# Patient Record
Sex: FEMALE | Race: WHITE
[De-identification: names, ages, dates, MRNs, and addresses within clinical notes are randomized per-mention and may not be internally consistent; named-entity substitution may affect disease eponyms.]

---

## 2017-03-09 ENCOUNTER — HOSPITAL ENCOUNTER (EMERGENCY)
Dept: HOSPITAL 47 - EC | Age: 77
Discharge: HOME | End: 2017-03-09
Payer: MEDICARE

## 2017-03-09 VITALS
DIASTOLIC BLOOD PRESSURE: 78 MMHG | HEART RATE: 98 BPM | RESPIRATION RATE: 18 BRPM | TEMPERATURE: 98.5 F | SYSTOLIC BLOOD PRESSURE: 135 MMHG

## 2017-03-09 DIAGNOSIS — K13.70: Primary | ICD-10-CM

## 2017-03-09 DIAGNOSIS — R05: ICD-10-CM

## 2017-03-09 DIAGNOSIS — Z79.899: ICD-10-CM

## 2017-03-09 LAB
ALP SERPL-CCNC: 162 U/L (ref 38–126)
ALT SERPL-CCNC: 31 U/L (ref 9–52)
ANION GAP SERPL CALC-SCNC: 10 MMOL/L
AST SERPL-CCNC: 28 U/L (ref 14–36)
BASOPHILS # BLD AUTO: 0 K/UL (ref 0–0.2)
BASOPHILS NFR BLD AUTO: 0 %
BUN SERPL-SCNC: 12 MG/DL (ref 7–17)
CALCIUM SPEC-MCNC: 9.7 MG/DL (ref 8.4–10.2)
CH: 29.6
CHCM: 33.5
CHLORIDE SERPL-SCNC: 101 MMOL/L (ref 98–107)
CO2 SERPL-SCNC: 29 MMOL/L (ref 22–30)
EOSINOPHIL # BLD AUTO: 0 K/UL (ref 0–0.7)
EOSINOPHIL NFR BLD AUTO: 0 %
ERYTHROCYTE [DISTWIDTH] IN BLOOD BY AUTOMATED COUNT: 4.28 M/UL (ref 3.8–5.4)
ERYTHROCYTE [DISTWIDTH] IN BLOOD: 14.1 % (ref 11.5–15.5)
GLUCOSE SERPL-MCNC: 96 MG/DL (ref 74–99)
HCT VFR BLD AUTO: 38 % (ref 34–46)
HDW: 2.79
HGB BLD-MCNC: 12.6 GM/DL (ref 11.4–16)
LUC NFR BLD AUTO: 3 %
LYMPHOCYTES # SPEC AUTO: 1.3 K/UL (ref 1–4.8)
LYMPHOCYTES NFR SPEC AUTO: 26 %
MCH RBC QN AUTO: 29.4 PG (ref 25–35)
MCHC RBC AUTO-ENTMCNC: 33.2 G/DL (ref 31–37)
MCV RBC AUTO: 88.8 FL (ref 80–100)
MONOCYTES # BLD AUTO: 0.3 K/UL (ref 0–1)
MONOCYTES NFR BLD AUTO: 5 %
NEUTROPHILS # BLD AUTO: 3.4 K/UL (ref 1.3–7.7)
NEUTROPHILS NFR BLD AUTO: 66 %
NON-AFRICAN AMERICAN GFR(MDRD): >60
POTASSIUM SERPL-SCNC: 4.6 MMOL/L (ref 3.5–5.1)
PROT SERPL-MCNC: 6.5 G/DL (ref 6.3–8.2)
SODIUM SERPL-SCNC: 140 MMOL/L (ref 137–145)
WBC # BLD AUTO: 0.16 10*3/UL
WBC # BLD AUTO: 5.1 K/UL (ref 3.8–10.6)
WBC (PEROX): 5.31

## 2017-03-09 PROCEDURE — 87529 HSV DNA AMP PROBE: CPT

## 2017-03-09 PROCEDURE — 85025 COMPLETE CBC W/AUTO DIFF WBC: CPT

## 2017-03-09 PROCEDURE — 36415 COLL VENOUS BLD VENIPUNCTURE: CPT

## 2017-03-09 PROCEDURE — 87252 VIRUS INOCULATION TISSUE: CPT

## 2017-03-09 PROCEDURE — 99283 EMERGENCY DEPT VISIT LOW MDM: CPT

## 2017-03-09 PROCEDURE — 87498 ENTEROVIRUS PROBE&REVRS TRNS: CPT

## 2017-03-09 PROCEDURE — 87430 STREP A AG IA: CPT

## 2017-03-09 PROCEDURE — 87798 DETECT AGENT NOS DNA AMP: CPT

## 2017-03-09 PROCEDURE — 87081 CULTURE SCREEN ONLY: CPT

## 2017-03-09 PROCEDURE — 80053 COMPREHEN METABOLIC PANEL: CPT

## 2017-03-09 NOTE — ED
ENT HPI





- General


Chief complaint: Dental/Oral


Stated complaint: BLISTERS ON TOUNGE


Time Seen by Provider: 03/09/17 12:37


Source: patient


Mode of arrival: ambulatory


Limitations: no limitations





- History of Present Illness


Initial comments: 





Complaining about some sores in her mouth number she had a sores on the back of 

the tongue she has a history of angioedema she is concerned about her tongue 

swelling this point she denies any tightening of the throat or shortness of 

breath or swelling of the tongue.  He also went to the dentist recently and 

concerned about herpetic lesions in her mouth.  Denies any headaches no neck 

stiffness no chest pain or shortness of breath no abdominal pain no frequency 

urgency dysuria





- Related Data


 Home Medications











 Medication  Instructions  Recorded  Confirmed


 


Calcium Carbonate/Vitamin D3 1 tab PO DAILY 03/09/17 03/09/17





[Calcium 500-Vit D3 600 Tablet]   


 


Folic Acid 1 mg PO DAILY 03/09/17 03/09/17


 


Loratadine [Claritin] 10 mg PO DAILY 03/09/17 03/09/17


 


Vit C/E/Zn/Coppr/Lutein/Zeaxan 1 cap PO DAILY 03/09/17 03/09/17





[Preservision Areds 2 Softgel]   


 


diphenhydrAMINE HCL [Benadryl] 25 mg PO Q8H PRN 03/09/17 03/09/17











 Allergies











Allergy/AdvReac Type Severity Reaction Status Date / Time


 


Penicillins Allergy  Unknown Verified 03/09/17 12:13














Review of Systems


ROS Statement: 


Those systems with pertinent positive or pertinent negative responses have been 

documented in the HPI.





ROS Other: All systems not noted in ROS Statement are negative.





Past Medical History


Additional Past Medical History / Comment(s): angioedema


History of Any Multi-Drug Resistant Organisms: None Reported


Past Surgical History: No Surgical Hx Reported


Past Psychological History: No Psychological Hx Reported


Smoking Status: Former smoker


Past Alcohol Use History: None Reported


Past Drug Use History: None Reported





General Exam





- General Exam Comments


Initial Comments: 


General:  The patient is awake and alert, in no distress, and does not appear 

acutely ill. 


Skin:  Skin is warm and dry and no rashes or lesions are noted. 


Eye:  Pupils are equal, round and reactive to light, extra-ocular movements are 

intact; there is normal conjunctiva bilaterally.  


Ears, nose, mouth and throat:  Small blisters on the tongue and some elevated 

lesions on the back of the tongue. 


Neck:  The neck is supple, there is no tenderness  or JVD.  


Cardiovascular:  There is a regular rate and rhythm. No murmur, rub or gallop 

is appreciated.


Respiratory: To auscultation bilateral, no wheezing no rhonchi no distress  

respiratory wise noticed


Gastrointestinal:  Soft, non-distended, non-tender abdomen without masses or 

organomegaly noted. There is no rebound or guarding present. Bowel sounds are 

unremarkable. 


Back:  There is no tenderness to palpation in the midline. There is no obvious 

deformity.


Musculoskeletal:  Normal ROM, no tenderness, There is no pedal edema. There is 

no calf tenderness or swelling. No cords were appreciated.  


Neurological:  CN II-XII intact, Cranial nerves III through XII are intact. 

There are no obvious motor or sensory deficits. Coordination appears grossly 

intact. Speech is normal.


Psychiatric:  Cooperative, appropriate mood & affect, normal judgment.  





Limitations: no limitations





Course





 Vital Signs











  03/09/17





  11:59


 


Temperature 98.8 F


 


Pulse Rate 111 H


 


Respiratory 20





Rate 


 


Blood Pressure 154/67


 


O2 Sat by Pulse 99





Oximetry 














Medical Decision Making





- Lab Data


Result diagrams: 


 03/09/17 13:15





 03/09/17 13:15





 Lab Results











  03/09/17 03/09/17 03/09/17 Range/Units





  12:57 13:15 13:15 


 


WBC   5.1   (3.8-10.6)  k/uL


 


RBC   4.28   (3.80-5.40)  m/uL


 


Hgb   12.6   (11.4-16.0)  gm/dL


 


Hct   38.0   (34.0-46.0)  %


 


MCV   88.8   (80.0-100.0)  fL


 


MCH   29.4   (25.0-35.0)  pg


 


MCHC   33.2   (31.0-37.0)  g/dL


 


RDW   14.1   (11.5-15.5)  %


 


Plt Count   51 L   (150-450)  k/uL


 


Neutrophils %   66   %


 


Lymphocytes %   26   %


 


Monocytes %   5   %


 


Eosinophils %   0   %


 


Basophils %   0   %


 


Neutrophils #   3.4   (1.3-7.7)  k/uL


 


Lymphocytes #   1.3   (1.0-4.8)  k/uL


 


Monocytes #   0.3   (0-1.0)  k/uL


 


Eosinophils #   0.0   (0-0.7)  k/uL


 


Basophils #   0.0   (0-0.2)  k/uL


 


Anisocytosis (manual)   Present   


 


Sodium    140  (137-145)  mmol/L


 


Potassium    4.6  (3.5-5.1)  mmol/L


 


Chloride    101  ()  mmol/L


 


Carbon Dioxide    29  (22-30)  mmol/L


 


Anion Gap    10  mmol/L


 


BUN    12  (7-17)  mg/dL


 


Creatinine    0.78  (0.52-1.04)  mg/dL


 


Est GFR (MDRD) Af Amer    >60  (>60 ml/min/1.73 sqM)  


 


Est GFR (MDRD) Non-Af    >60  (>60 ml/min/1.73 sqM)  


 


Glucose    96  (74-99)  mg/dL


 


Calcium    9.7  (8.4-10.2)  mg/dL


 


Total Bilirubin    1.0  (0.2-1.3)  mg/dL


 


AST    28  (14-36)  U/L


 


ALT    31  (9-52)  U/L


 


Alkaline Phosphatase    162 H  ()  U/L


 


Total Protein    6.5  (6.3-8.2)  g/dL


 


Albumin    4.0  (3.5-5.0)  g/dL


 


Group A Strep Rapid  Negative    (Negative)  














Disposition


Clinical Impression: 


 Mouth sores





Disposition: HOME SELF-CARE


Instructions:  Canker Sores (ED)

## 2017-03-10 LAB — Lab: (no result)

## 2018-02-28 ENCOUNTER — HOSPITAL ENCOUNTER (INPATIENT)
Dept: HOSPITAL 47 - EC | Age: 78
LOS: 1 days | Discharge: HOME | DRG: 916 | End: 2018-03-01
Payer: MEDICARE

## 2018-02-28 VITALS — BODY MASS INDEX: 22.4 KG/M2

## 2018-02-28 DIAGNOSIS — Z87.891: ICD-10-CM

## 2018-02-28 DIAGNOSIS — Z83.3: ICD-10-CM

## 2018-02-28 DIAGNOSIS — D69.3: ICD-10-CM

## 2018-02-28 DIAGNOSIS — Z85.828: ICD-10-CM

## 2018-02-28 DIAGNOSIS — T78.3XXA: Primary | ICD-10-CM

## 2018-02-28 DIAGNOSIS — Z88.0: ICD-10-CM

## 2018-02-28 DIAGNOSIS — Z82.49: ICD-10-CM

## 2018-02-28 DIAGNOSIS — K21.9: ICD-10-CM

## 2018-02-28 LAB — GLUCOSE BLD-MCNC: 141 MG/DL (ref 75–99)

## 2018-02-28 PROCEDURE — 70450 CT HEAD/BRAIN W/O DYE: CPT

## 2018-02-28 PROCEDURE — 96374 THER/PROPH/DIAG INJ IV PUSH: CPT

## 2018-02-28 PROCEDURE — 70360 X-RAY EXAM OF NECK: CPT

## 2018-02-28 PROCEDURE — 71046 X-RAY EXAM CHEST 2 VIEWS: CPT

## 2018-02-28 PROCEDURE — 99285 EMERGENCY DEPT VISIT HI MDM: CPT

## 2018-02-28 PROCEDURE — 96375 TX/PRO/DX INJ NEW DRUG ADDON: CPT

## 2018-02-28 PROCEDURE — 96376 TX/PRO/DX INJ SAME DRUG ADON: CPT

## 2018-02-28 RX ADMIN — DEXAMETHASONE SODIUM PHOSPHATE SCH MG: 4 INJECTION, SOLUTION INTRAMUSCULAR; INTRAVENOUS at 23:53

## 2018-02-28 RX ADMIN — FAMOTIDINE SCH MG: 10 INJECTION, SOLUTION INTRAVENOUS at 20:50

## 2018-02-28 RX ADMIN — DIPHENHYDRAMINE HYDROCHLORIDE SCH MG: 50 INJECTION, SOLUTION INTRAMUSCULAR; INTRAVENOUS at 23:53

## 2018-02-28 RX ADMIN — DIPHENHYDRAMINE HYDROCHLORIDE SCH MG: 50 INJECTION, SOLUTION INTRAMUSCULAR; INTRAVENOUS at 15:38

## 2018-02-28 RX ADMIN — DIPHENHYDRAMINE HYDROCHLORIDE SCH MG: 50 INJECTION, SOLUTION INTRAMUSCULAR; INTRAVENOUS at 18:48

## 2018-02-28 RX ADMIN — CEFAZOLIN SCH MLS/HR: 330 INJECTION, POWDER, FOR SOLUTION INTRAMUSCULAR; INTRAVENOUS at 13:23

## 2018-02-28 RX ADMIN — CEFAZOLIN SCH MLS/HR: 330 INJECTION, POWDER, FOR SOLUTION INTRAMUSCULAR; INTRAVENOUS at 23:53

## 2018-02-28 RX ADMIN — DEXAMETHASONE SODIUM PHOSPHATE SCH MG: 4 INJECTION, SOLUTION INTRAMUSCULAR; INTRAVENOUS at 18:49

## 2018-02-28 NOTE — XR
EXAMINATION TYPE: XR chest 2V

 

DATE OF EXAM: 2/28/2018

 

COMPARISON: 6/1/2012

 

HISTORY: Throat and tongue swelling. History of angioedema.

 

TECHNIQUE:  Frontal and lateral views of the chest are obtained.

 

FINDINGS:  There is no focal air space opacity, pleural effusion, or pneumothorax seen.  The cardiac 
silhouette size is within normal limits.   The osseous structures are intact. Minimal multilevel dege
nerative changes of the thoracic spine with a slightly exaggerated thoracic kyphosis is noted. Infrag
lottic tracheal narrowing can relate to phase of expiration.

 

IMPRESSION: Infraglottic tracheal narrowing is noted however this can relate to the degree of expirat
ion. There is concern for injury edema direct visualization is recommended. No acute cardiopulmonary 
process.

## 2018-02-28 NOTE — XR
EXAMINATION TYPE: XR soft tissue neck

 

DATE OF EXAM: 2/28/2018

 

COMPARISON: Chest x-ray of the same date

 

HISTORY: Angioedema. Throat swelling.

 

TECHNIQUE: Frontal and lateral soft tissues of the neck were obtained.

 

FINDINGS: Multilevel moderate degenerative changes of the cervical spine are seen as intervertebral d
isc space nearing, uncovertebral or protrusion, facet arthropathy, small interosseous heights and end
plate sclerosis. There is no prevertebral soft tissue swelling.

 

The epiglottis is prominent in size. There does appear to be soft tissue swelling of the neck anterio
r to the thyroid cartilage. The frontal image is suboptimal but redemonstrates mild infraglottic narr
owing that could relate to degree of expiration. Supraglottic airway is patent.

 

IMPRESSION: 

1. Frontal image is suboptimal but demonstrates mild infraglottic narrowing is seen on the chest radi
ograph. This can be seen in degree of expiration. There is further concern direct visualization is re
commended.

2. Nonspecific soft tissue swelling in an anterior to the thyroid cartilage.

3. Prominence of the epiglottis that could relate to obliquity as epiglottitis is not favored given t
his patient's age.

## 2018-02-28 NOTE — P.HPIM
History of Present Illness


H&P Date: 02/28/18


Chief Complaint: Angioedema and swelling





This is a pleasant 77-year-old lady patient of Dr. Vallejo.  She has underlying 

of angioedema for the past 7 years, hypertension, GERD, hyperlipidemia, history 

of splenomegaly and previous herpes zoster admitted through the emergency room 

secondary to acute onset of angioedema.  Patient was well and she'll T hours 

prior to admission when patient woke up at 4:00 in the morning with tongue 

swelling and thickening, this would be the sixth episode in 6 years, per 

patient is always occurs in the morning upon awakening at 4:00 this morning, on 

each episode.  Last night she has IVs fish sandwich and curly fries, and a 

snack consisting of jelly roll, and seen a month ago, which she normally 

consumes almost on a daily basis.  Patient goes to [facility] with the same state 

fish sandwich 2 times a month with no previous reactions.  Last night in the 

evening she went to [facility] and had no other ingestion except for contact to a 

convincing of that was a disinfectant for carts.  She normally takes Claritin 

at bedtime, however this did not help this time.  Patient has multiple testing 

done including an allergist tests from Dr. Rondon, and she was referred to 

[facility] who did more blood work testing, also ruled out with any 

connective tissue disease according to all these panels, she is okay with 

seafood H shellfish and nuts and cannot put an etiology for her angioedema.  

There is no recent medications from any other physicians, over-the-counter 

medications include calcium and vitamin D 3 and Claritin preservation which hasn

't changed for years.  They have thought that the culprit could be related to 

feel her is from multivitamins however night this change recently.  She denies 

any acute exposure to chemicals at home include being hairspray cleansers or 

disinfectants insecticides at home





She currently is in ICU being closely monitored, Dr. Brewster from critical care 

medicine, in the emergency room x-ray was done that shows nonspecific soft 

tissue swelling in the anterior to the Cardiologic cartilage, prominent 

epiglottis supraglottic airway is patent mild in subglottic narrowing, in the 

emergency room she was given methylprednisone 1.5 mg IV, and currently is on 

dexamethasone 4 mg every 6 hours along with Benadryl 50 mg IV push every 6 hours





Review of Systems


Constitutional: Reports as per HPI, Denies anorexia, Denies chills, Denies 

chronic headaches, Denies chronic pain, Denies daytime sleepiness, Denies 

fatigue, Denies fever, Denies lethargy, Denies malaise, Denies night sweats, 

Denies poor appetite, Denies sweats, Denies weakness, Denies weight gain, 

Denies weight loss


Ears, nose, mouth and throat: Reports as per HPI, Denies ant. neck pain, Denies 

bleeding gums, Denies dental pain, Denies dysphagia, Denies epistaxis, Denies 

headache, Denies hoarseness, Denies mouth pain, Denies nasal congestion, Denies 

nasal discharge, Denies neck fullness/pressure, Denies neck lump, Denies nose 

pain, Denies odynophagia, Denies post-nasal drip, Denies sinus pain, Denies 

sinus pressure, Denies swelling in mouth, Denies swelling in throat, Denies 

sore throat, Denies vertigo, Denies voice changes


Cardiovascular: Reports as per HPI, Denies chest pain, Denies claudication, 

Denies decreased exercise tolerance, Denies dyspnea on exertion, Denies edema, 

Denies high blood pressure, Denies irregular heart beat, Denies leg edema, 

Denies lightheadedness, Denies orthopnea, Denies palpitations, Denies 

paroxysmal nocturnal dyspnea, Denies phlebitis, Denies rapid heart beat, Denies 

shortness of breath (Difficulty in breathing), Denies syncope


Respiratory: Reports as per HPI, Denies congestion, Denies cough, Denies cough 

with sputum, Denies dyspnea, Denies excessive sputum, Denies hemoptysis, Denies 

home oxygen, Denies pain, Denies pain on inspiration, Denies pleurisy, Denies 

respiratory infections, Denies sleep apnea, Denies snoring, Denies wheezing


Gastrointestinal: Reports as per HPI, Denies abdominal pain, Denies belching, 

Denies bloating, Denies BRBPR, Denies change in bowel habits, Denies coffee 

ground emesis, Denies constipation, Denies diarrhea, Denies dyspepsia, Denies 

early satiety, Denies excessive gas, Denies heartburn, Denies hematemesis, 

Denies hematochezia, Denies indigestion, Denies jaundice, Denies lactose 

intolerance, Denies loss of appetite, Denies melena, Denies nausea, Denies 

vomiting


Genitourinary: Reports as per HPI


Menstruation: Reports as per HPI, Denies amenorrhea, Denies amenorrhea on BC, 

Denies currently menstrual, Denies cycle < 21 days, Denies cycle > 35 days, 

Denies cycle variable, Denies menses 1-7 days, Denies menses 8 or > days, 

Denies menses variable, Denies period heavy, Denies period light, Denies period 

normal, Denies period spotting, Denies post hysterectomy, Denies postmenopausal

, Denies premenarcheal


Musculoskeletal: Reports as per HPI, Denies arm numbness/tingling, Denies 

atrophy, Denies fractures, Denies frequent falls, Denies gait dysfunction, 

Denies hot joints, Denies leg numbness/tingling, Denies limitation of motion, 

Denies loss of height, Denies low back pain, Denies morning stiffness, Denies 

muscle cramps, Denies muscle weakness, Denies myalgias, Denies neck pain, 

Denies neck stiffness, Denies prior amputations, Denies redness of joints, 

Denies shooting arm pain, Denies shooting leg pain


Integumentary: Reports as per HPI, Denies acne, Denies boils, Denies brittle 

nails, Denies change in hair/nails, Denies color changes, Denies darkening of 

skin, Denies depigmentation, Denies dryness, Denies foot/leg ulcers, Denies 

growths, Denies hirsutism, Denies lesions, Denies onychomycosis, Denies pruritus

, Denies rash, Denies sores, Denies striae, Denies unusual bruising, Denies 

wounds


Neurological: Reports as per HPI, Denies aphasia, Denies ataxia, Denies balance 

difficulties, Denies burning pain, Denies change in mentation, Denies change in 

smell/taste, Denies change in speech, Denies confusion, Denies convulsions, 

Denies double vision, Denies gait dysfunction, Denies head injury, Denies 

headaches, Denies hearing difficulties, Denies lack of coordination, Denies 

loss of vision, Denies memory loss, Denies migraines, Denies motor disturbance, 

Denies numbness, Denies paralysis, Denies paresthesias, Denies seizures, Denies 

sensory deficit, Denies spasticity, Denies syncope, Denies tic, Denies tingling

, Denies transient paralysis, Denies tremors, Denies vertigo, Denies weakness, 

Denies visual changes


Psychiatric: Reports as per HPI


Endocrine: Reports as per HPI


Hematologic/Lymphatic: Reports as per HPI


Allergic/Immunologic: Reports as per HPI





Past Medical History


Past Medical History: Hyperlipidemia, Hypertension


Additional Past Medical History / Comment(s): angioedema, basal cell carcinoma 

removal


History of Any Multi-Drug Resistant Organisms: None Reported


Past Surgical History: No Surgical Hx Reported


Additional Past Surgical History / Comment(s): Cataracts surgery


Past Anesthesia/Blood Transfusion Reactions: No Reported Reaction


Past Psychological History: No Psychological Hx Reported


Smoking Status: Former smoker


Past Alcohol Use History: None Reported


Past Drug Use History: None Reported





- Past Family History


  ** Mother


Family Medical History: Hypertension, Myocardial Infarction (MI)





  ** Father


Family Medical History: Diabetes Mellitus, Hypertension





  ** Brother(s)


Family Medical History: No Reported History





  ** Daughter(s)


History Unknown: Yes (Healthy 2 daughters no sons no  sisters)





Medications and Allergies


 Home Medications











 Medication  Instructions  Recorded  Confirmed  Type


 


Calcium Carbonate/Vitamin D3 1 tab PO DAILY 03/09/17 02/28/18 History





[Calcium 500-Vit D3 600 Tablet]    


 


Folic Acid 1 mg PO DAILY 03/09/17 02/28/18 History


 


Vit C/E/Zn/Coppr/Lutein/Zeaxan 1 cap PO DAILY 03/09/17 02/28/18 History





[Preservision Areds 2 Softgel]    











 Allergies











Allergy/AdvReac Type Severity Reaction Status Date / Time


 


Penicillins Allergy Unknown Unknown Verified 02/28/18 12:07














Physical Exam


Vitals: 


 Vital Signs











  Temp Pulse Resp BP Pulse Ox


 


 02/28/18 15:00   87  17  134/75  96


 


 02/28/18 14:00   96  26 H  108/58  97


 


 02/28/18 13:00   93  20  122/58  99


 


 02/28/18 12:00   91  20  140/68  95


 


 02/28/18 11:04  98.3 F  97  25 H  145/63  98


 


 02/28/18 10:45  97.9 F  78  16  147/71  96


 


 02/28/18 10:23   103 H   153/72 


 


 02/28/18 09:58  97.8 F  81  16  136/70  97


 


 02/28/18 08:38     135/64 


 


 02/28/18 08:27   85  18   96


 


 02/28/18 07:00  97.2 F L  92  18  177/76  98








 Intake and Output











 02/28/18 02/28/18 02/28/18





 06:59 14:59 22:59


 


Intake Total  230 85


 


Balance  230 85


 


Intake:   


 


  IV  170 85


 


    Sodium Chloride 0.9% 1,  170 85





    000 ml @ 85 mls/hr IV .   





    [id number] MIKE Rx#:[id number]   


 


  Oral  60 


 


Other:   


 


  Voiding Method  Toilet 


 


  # Voids  1 


 


  Weight  50.3 kg 








 Patient Weight











 03/01/18





 06:59


 


Weight 50.3 kg














- Constitutional


General appearance: average body habitus, cooperative, no acute distress





- EENT


Eyes: anicteric sclerae, EOMI, PERRLA, dentition normal


ENT: NA/AT, normal oropharynx





- Neck


Neck: no lymphadenopathy, normal ROM, no other, no rigidity, no stridor, no 

thyromegaly





- Respiratory


Respiratory: bilateral: CTA, negative: diminished, dullness, rales, rhonchi, 

wheezing





- Cardiovascular


Rhythm: regular


Heart sounds: normal: S1, S2


Abnormal Heart Sounds: no systolic murmur, no diastolic murmur, no rub, no S3 

Gallop, no S4 Gallop, no click, no other





- Gastrointestinal


General gastrointestinal: normal bowel sounds, soft





- Integumentary


Integumentary: decreased turgor, normal





- Musculoskeletal


Musculoskeletal: gait normal, strength equal bilaterally





- Psychiatric


Psychiatric: A&O x's 3, appropriate affect, intact judgment & insight





Results


Labs: 


 Abnormal Lab Results - Last 24 Hours (Table)











  02/28/18 Range/Units





  11:04 


 


POC Glucose (mg/dL)  141 H  (75-99)  mg/dL














Thrombosis Risk Factor Assmnt





- DVT/VTE Prophylaxis


DVT/VTE Prophylaxis: Low risk, early ambulation encouraged





- Choose All That Apply


Each Risk Factor Represents 3 Points: Age 75 years or older


Thrombosis Risk Factor Assessment Total Risk Factor Score: 3


Thrombosis Risk Factor Assessment Level: Moderate Risk





Assessment and Plan


Plan: 





1.  Angioedema with recurrent episodes the in past 6 years, etiology is 

currently unknown, most likely related to unknown food for his, preservatives 

from food, food dyes, and other chemicals related to household use, nocturnal 

events are noted with most often do during this attack, patient is on 

prophylactic treatment Claritin every nighttime, patient is currently on 

dexamethasone 6 mg to 6 hours as well as Benadryl 50 mg every 6 hours, being 

closely monitored in ICU, has improvement of symptoms without any ventilatory 

support.  Patient is stable,


Patient had multiple workup in the past including consultations from ALLERGY 

Dr. Rondon, and [facility], and was ruled out for any 

connective tissue disease from Dr. White.  Patient may elect to carry EpiPen 

for outpatient use





2.  History of hypertension currently normotensive and not requiring any 

medications





3.  Hyperlipidemia and is not on any statin next





4.  Disorder bone density, on vitamin D





5.  DVT prophylaxis and GI prophylaxis





6.  Expected length of stay 24 hours, most likely to be discharged in the 

morning

## 2018-02-28 NOTE — P.CNPUL
History of Present Illness


Consult date: 02/28/18


Reason for consult: dyspnea, other


Chief complaint: Angioedema


History of present illness: 





Consult dated 02/28/2018





77-year-old female who presented to the emergency department with complaints of 

tongue swelling.  It started about 4:00 in the morning.  The patient has a 

history of angioedema.  She's had at least 6 episodes.  She took 25 mg Benadryl 

and came to the emergency room.  She apparently been evaluated by one of the 

allergist/immunologist here in town and another one note specific diagnosis was 

given.  She apparently said a lot of testing including various blood tests and 

ALLERGY testing.  It was thought to be related to maybe a filler or a component 

of medication.  She only takes a few things including vitamin D3 with calcium, 

folate acid, and PreserVision.  She takes nothing else.  She has no major 

medical problems other than the angioedema and apparently ITP.  She may have 

idiopathic angioedema since his been no clearcut association with foods 

medications or disease processes.





Review of Systems





A 12 point review of system is positive for tongue swelling and some difficulty 

swallowing and some increased saliva in the oral cavity.  The patient is not 

having any difficulty breathing.  The rest of the review of system is 

unremarkable.





Past Medical History


Additional Past Medical History / Comment(s): angioedema


History of Any Multi-Drug Resistant Organisms: None Reported


Past Surgical History: No Surgical Hx Reported


Past Psychological History: No Psychological Hx Reported


Smoking Status: Former smoker


Past Alcohol Use History: None Reported


Past Drug Use History: None Reported





Medications and Allergies


 Home Medications











 Medication  Instructions  Recorded  Confirmed  Type


 


Calcium Carbonate/Vitamin D3 1 tab PO DAILY 03/09/17 02/28/18 History





[Calcium 500-Vit D3 600 Tablet]    


 


Folic Acid 1 mg PO DAILY 03/09/17 02/28/18 History


 


Vit C/E/Zn/Coppr/Lutein/Zeaxan 1 cap PO DAILY 03/09/17 02/28/18 History





[Preservision Areds 2 Softgel]    











 Allergies











Allergy/AdvReac Type Severity Reaction Status Date / Time


 


Penicillins Allergy  Unknown Verified 02/28/18 07:27














Physical Exam


Osteopathic Statement: *.  No significant issues noted on an osteopathic 

structural exam other than those noted in the History and Physical/Consult.


Vitals: 


 Vital Signs











  Temp Pulse Resp BP Pulse Ox


 


 02/28/18 10:45  97.9 F  78  16  147/71  96


 


 02/28/18 10:23   103 H   153/72 


 


 02/28/18 09:58  97.8 F  81  16  136/70  97


 


 02/28/18 08:38     135/64 


 


 02/28/18 08:27   85  18   96


 


 02/28/18 07:00  97.2 F L  92  18  177/76  98








 Intake and Output











 02/27/18 02/28/18 02/28/18





 22:59 06:59 14:59


 


Other:   


 


  Weight   47.174 kg








 Patient Weight











 03/01/18





 06:59


 


Weight 47.174 kg














No acute distress, oriented 3.





HEENT examination is grossly unremarkable.  Mucous membranes are moist.  No 

oral lesions.





Neck supple.  Full range of motion.  No adenopathy thyromegaly or neck vein 

distention.  No evidence of stridor





Cardiovascular examination reveals regular rhythm rate.  S1-S2 normal.  No S3 

or S4.  No discernible murmur noted.





Lungs reveal clear breath sounds.  Her sounds are equal bilaterally.  No 

adventitious lung sounds including wheezes rhonchi or crackles.





Abdomen soft bowel sounds are heard.  No masses or tenderness.





Extremities are intact.  No cyanosis clubbing or edema.





Skin is without rash or lesion.





Neurologic examination is brief but nonfocal.





Results





- Laboratory Findings


Abnormal lab findings: 


 Abnormal Labs











  02/28/18





  11:04


 


POC Glucose (mg/dL)  141 H














- Diagnostic Findings


Chest x-ray: image reviewed (X-rays labs and medications are all reviewed.)





Assessment and Plan


Assessment: 





Assessment





Idiopathic angioedema, chronic





ITP


Plan: 





Plan dated 02/28/2018





Medications labs and x-rays are reviewed.  The patient should receive a H1 an 

H2 histamine antagonist.  We'll make sure she is on both of those.


Addition steroids are appropriate.  Probably Decadron is the best.  Additional 

recommendations and suggestions forthcoming.  We will watch her here in the 

ICU.  Could probably be discharged later today or tomorrow.


Time with Patient: Greater than 30

## 2018-02-28 NOTE — ED
General Adult HPI





- General


Chief complaint: Allergic Reaction


Stated complaint: tongue and throat swelling hx angioedema


Time Seen by Provider: 02/28/18 07:23


Source: patient, RN notes reviewed


Mode of arrival: ambulatory


Limitations: no limitations





- History of Present Illness


Initial comments: 





Patient is a pleasant 77-year-old female presenting to the emergency department 

with complaints of tongue swelling.  Onset of symptoms was around 4 AM.  

Symptoms have worsened since onset.  Patient took one 25 mg Benadryl around 4 

AM and another one around 5 AM.  Patient denies dyspnea.  Patient states it is 

slightly difficult to swallow.  Patient has had similar episodes approximately 

5 times previously.  Patient states usually the Benadryl will help patient has 

been evaluated for this with her immunologist as well.





- Related Data


 Home Medications











 Medication  Instructions  Recorded  Confirmed


 


Calcium Carbonate/Vitamin D3 1 tab PO DAILY 03/09/17 02/28/18





[Calcium 500-Vit D3 600 Tablet]   


 


Folic Acid 1 mg PO DAILY 03/09/17 02/28/18


 


Vit C/E/Zn/Coppr/Lutein/Zeaxan 1 cap PO DAILY 03/09/17 02/28/18





[Preservision Areds 2 Softgel]   











 Allergies











Allergy/AdvReac Type Severity Reaction Status Date / Time


 


Penicillins Allergy  Unknown Verified 02/28/18 07:27














Review of Systems


ROS Statement: 


Those systems with pertinent positive or pertinent negative responses have been 

documented in the HPI.





ROS Other: All systems not noted in ROS Statement are negative.


Constitutional: Denies: fever


Eyes: Denies: eye pain


ENT: Denies: ear pain


Respiratory: Denies: cough, dyspnea


Cardiovascular: Denies: chest pain


Endocrine: Denies: fatigue


Gastrointestinal: Denies: abdominal pain


Genitourinary: Denies: dysuria


Musculoskeletal: Denies: back pain


Skin: Denies: rash


Neurological: Denies: weakness





Past Medical History


Additional Past Medical History / Comment(s): angioedema


History of Any Multi-Drug Resistant Organisms: None Reported


Past Surgical History: No Surgical Hx Reported


Past Psychological History: No Psychological Hx Reported


Smoking Status: Former smoker


Past Alcohol Use History: None Reported


Past Drug Use History: None Reported





General Exam


Limitations: no limitations


General appearance: alert, in no apparent distress


Head exam: Present: atraumatic


Eye exam: Present: normal appearance, PERRL


ENT exam: Present: other (Angioedema of the left side of the tongue, mild to 

moderate.  Mild edema of the left pharynx.)


Neck exam: Present: normal inspection


Respiratory exam: Present: normal lung sounds bilaterally.  Absent: respiratory 

distress, wheezes


Cardiovascular Exam: Present: regular rate, normal rhythm


GI/Abdominal exam: Present: soft.  Absent: tenderness


Extremities exam: Present: normal inspection


Neurological exam: Present: alert


Psychiatric exam: Present: normal affect, normal mood


Skin exam: Present: normal color





Course


 Vital Signs











  02/28/18 02/28/18 02/28/18





  07:00 08:27 08:38


 


Temperature 97.2 F L  


 


Pulse Rate 92 85 


 


Respiratory 18 18 





Rate   


 


Blood Pressure 177/76  135/64


 


O2 Sat by Pulse 98 96 





Oximetry   














  02/28/18





  09:58


 


Temperature 97.8 F


 


Pulse Rate 81


 


Respiratory 16





Rate 


 


Blood Pressure 136/70


 


O2 Sat by Pulse 97





Oximetry 














- Reevaluation(s)


Reevaluation #1: 





02/28/18 10:10


Patient reevaluated and is having some gagging.  Uvula swelling has increased.  

Tongue swelling has slightly decreased.  Patient updated on plan.  Case was 

discussed with Dr. Way, who will admit for Dr. Wallace.  Dr. Brewster has been 

called.





Disposition


Clinical Impression: 


 Angioedema





Disposition: ADMITTED AS IP TO THIS HOSP


Referrals: 


Iban Wallace MD [Primary Care Provider] - 1-2 days


Decision Time: 10:16

## 2018-03-01 VITALS — SYSTOLIC BLOOD PRESSURE: 113 MMHG | RESPIRATION RATE: 19 BRPM | HEART RATE: 78 BPM | DIASTOLIC BLOOD PRESSURE: 57 MMHG

## 2018-03-01 VITALS — TEMPERATURE: 98.1 F

## 2018-03-01 RX ADMIN — DIPHENHYDRAMINE HYDROCHLORIDE SCH MG: 50 INJECTION, SOLUTION INTRAMUSCULAR; INTRAVENOUS at 06:27

## 2018-03-01 RX ADMIN — FAMOTIDINE SCH MG: 10 INJECTION, SOLUTION INTRAVENOUS at 09:21

## 2018-03-01 RX ADMIN — DEXAMETHASONE SODIUM PHOSPHATE SCH MG: 4 INJECTION, SOLUTION INTRAMUSCULAR; INTRAVENOUS at 06:27

## 2018-03-01 NOTE — P.PN
Subjective


Progress Note Date: 03/01/18


Principal diagnosis: 





Angioedema





Progress note dated 03/01/2018





This is a 77-year-old female Y saw yesterday here in the ICU.  She was admitted 

to the emergency room with a diagnosis of angioedema.  She likely suffers some 

idiopathic angioedema.  By the time she get up to the ICU.  ICU she was doing 

much better.  The patient was treated with steroids and Benadryl.  She also 

received H2 histamine antagonist.  She apparently became very confused this 

morning about 6:30.  A computed tomography scan was ordered.  For anything 

acute.  From my perspective, the patient could be discharged home.  She doesn't 

take a lot of medications.  Benadryl and Decadron were discontinued by the 

primary service.  Again from my perspective the patient could be discharged 

home.  Her only usual medications include calcium with vitamin D3 full of gas 

and PreserVision.  Her only eliminate major medical problem in addition to the 

angioedema is idiopathic thrombocytopenia purpura.





Objective





- Vital Signs


Vital signs: 


 Vital Signs











Temp  97.9 F   03/01/18 04:00


 


Pulse  71   03/01/18 07:00


 


Resp  18   03/01/18 07:00


 


BP  104/53   03/01/18 07:00


 


Pulse Ox  97   03/01/18 07:00








 Intake & Output











 02/28/18 03/01/18 03/01/18





 18:59 06:59 18:59


 


Intake Total 770 1260 85


 


Balance 770 1260 85


 


Weight 50.3 kg 48.8 kg 


 


Intake:   


 


   1020 85


 


    Sodium Chloride 0.9% 1, 510 1020 85





    000 ml @ 85 mls/hr IV .   





    [id number] [facility] Rx#:[id number]   


 


  Oral 260 240 


 


Other:   


 


  Voiding Method Toilet Toilet 


 


  # Voids 1 1 1














- Exam





No acute distress, oriented 3.





HEENT examination is grossly unremarkable.  Mucous membranes are moist.  No 

oral lesions.





Neck supple.  Full range of motion.  No adenopathy thyromegaly or neck vein 

distention.





Cardiovascular examination reveals regular rhythm rate.  S1-S2 normal.  No S3 

or S4.  No discernible murmur noted.





Lungs reveal clear breath sounds.  Her sounds are equal bilaterally.  No 

adventitious lung sounds including wheezes rhonchi or crackles.





Abdomen soft bowel sounds are heard.  No masses or tenderness.





Extremities are intact.  No cyanosis clubbing or edema.





Skin is without rash or lesion.





Neurologic examination is brief but nonfocal.





- Labs


Labs: 


 Abnormal Lab Results - Last 24 Hours (Table)











  02/28/18 Range/Units





  11:04 


 


POC Glucose (mg/dL)  141 H  (75-99)  mg/dL














Assessment and Plan


Assessment: 





Assessment





Idiopathic angioedema, chronic





ITP





Acute confusion, likely related to medications and/or sundowning, resolved.


Plan: 





Plan dated 02/28/2018





Medications labs and x-rays are reviewed.  The patient should receive a H1 an 

H2 histamine antagonist.  We'll make sure she is on both of those.


Addition steroids are appropriate.  Probably Decadron is the best.  Additional 

recommendations and suggestions forthcoming.  We will watch her here in the 

ICU.  Could probably be discharged later today or tomorrow.





Plan dated 03/01/2018





The patient is doing well now.  She seems to be over her episode of confusion.  

The CAT scan showed nothing acute.  Decadron and Benadryl were discontinued.  

The patient could be discharged home from my perspective.  The primary service 

to let them know.


Time with Patient: Less than 30

## 2018-03-01 NOTE — CT
EXAMINATION TYPE: CT brain wo con

 

DATE OF EXAM: 3/1/2018

 

COMPARISON: NONE

 

HISTORY: Confusion

 

CT DLP: 1052 mGycm

 

Unenhanced CT of the brain was performed.  

 

The ventricles, basal cisterns and sulci overlying the cerebral convexities demonstrate mild enlargem
ent. 

 

There is no evidence for intracranial hemorrhage or sulcal effacement.  

 

There is decreased attenuation about the periventricular white matter and deep white matter of both c
erebral hemispheres, compatible with chronic small vessel ischemia. Differential diagnosis does inclu
de demyelination. 

 

No mass effects are seen.No midline shift.

 

Osseous calvarium is intact.  

 

If symptoms persist consider MRI.  Moderate opacification left sphenoid sinus.

 

IMPRESSION:

 

1. Age related atrophic and chronic small vessel ischemic change without acute intracranial process s
een at this time.

2. Chronic sinusitis.

## 2018-03-02 NOTE — P.DS
Providers


Date of admission: 


02/28/18 10:17





Expected date of discharge: 03/01/18


Attending physician: 


Liss Gomez MD





Consults: 





 





02/28/18 10:17


Consult Physician Urgent 


   Consulting Provider: Zack Brewster Reason/Comments: critical care


   Do you want consulting provider notified?: Yes











Primary care physician: 


[facility] Course: 





This is a pleasant 77-year-old lady patient of Dr. Vallejo.  She has underlying 

of angioedema for the past 7 years, hypertension, GERD, hyperlipidemia, history 

of splenomegaly and previous herpes zoster admitted through the emergency room 

secondary to acute onset of angioedema.  Patient was well and she'll T hours 

prior to admission when patient woke up at 4:00 in the morning with tongue 

swelling and thickening, this would be the sixth episode in 6 years, per 

patient is always occurs in the morning upon awakening at 4:00 this morning, on 

each episode.  Last night she has IVs fish sandwich and curly fries, and a 

snack consisting of jelly roll, and seen a month ago, which she normally 

consumes almost on a daily basis.  Patient goes to [employer] with the same state 

fish sandwich 2 times a month with no previous reactions.  Last night in the 

evening she went to [facility] and had no other ingestion except for contact to a 

convincing of that was a disinfectant for carts.  She normally takes Claritin 

at bedtime, however this did not help this time.  Patient has multiple testing 

done including an allergist tests from Dr. Rondon, and she was referred to 

[facility] who did more blood work testing, also ruled out with any 

connective tissue disease according to all these panels, she is okay with 

seafood H shellfish and nuts and cannot put an etiology for her angioedema.  

There is no recent medications from any other physicians, over-the-counter 

medications include calcium and vitamin D 3 and Claritin preservation which hasn

't changed for years.  They have thought that the culprit could be related to 

feel her is from multivitamins however night this change recently.  She denies 

any acute exposure to chemicals at home include being hairspray cleansers or 

disinfectants insecticides at home





She currently is in ICU being closely monitored, Dr. Brewster from critical care 

medicine, in the emergency room x-ray was done that shows nonspecific soft 

tissue swelling in the anterior to the Cardiologic cartilage, prominent 

epiglottis supraglottic airway is patent mild in subglottic narrowing, in the 

emergency room she was given methylprednisone 1.5 mg IV, and currently is on 

dexamethasone 4 mg every 6 hours along with Benadryl 50 mg IV push every 6 hours





3/1: Patient has been seen by Dr. Brewster this morning cleared for discharge 

home.  Patient's vital signs have been stable.  Pulse ox is 94-97% on room air.

  Nursing this passed and the patient had some confusion this morning at 6 AM 

but this is improved at this time.  This is most likely secondary to 

combination of steroids and Benadryl.  She did have a CAT scan of the brain 

that did not show any acute findings.  Patient will be on only a brief course 

of prednisone at home.  Swelling is improved.  She does have slight 

inflammation of the uvula only.  No exudate.  Patient will be discharged home 

today in stable condition.








Discharge Diagnoses:


1.  Angioedema with recurrent episodes the in past 6 years, etiology is 

currently unknown


2.  History of hypertension 


3.  Hyperlipidemia 


4.  Disorder bone density





Discharge plan: Return home





Impression and plan of care have been directed as dictated by the signing 

physician.  Araceli Almodovar nurse practitioner acting as scribe for signing 

physician.


Patient Condition at Discharge: Good





Plan - Discharge Summary


Discharge Rx Participant: Yes


New Discharge Prescriptions: 


New


   Famotidine [Pepcid] 20 mg PO BID #60 tablet


   predniSONE 10 mg PO DAILY #9 tab





Continue


   Vit C/E/Zn/Coppr/Lutein/Zeaxan [Preservision Areds 2 Softgel] 1 cap PO DAILY


   Folic Acid 1 mg PO DAILY


   Calcium Carbonate/Vitamin D3 [Calcium 500-Vit D3 600 Tablet] 1 tab PO DAILY


Discharge Medication List





Calcium Carbonate/Vitamin D3 [Calcium 500-Vit D3 600 Tablet] 1 tab PO DAILY 03/ 09/17 [History]


Folic Acid 1 mg PO DAILY 03/09/17 [History]


Vit C/E/Zn/Coppr/Lutein/Zeaxan [Preservision Areds 2 Softgel] 1 cap PO DAILY 03/ 09/17 [History]


Famotidine [Pepcid] 20 mg PO BID #60 tablet 03/01/18 [Rx]


predniSONE 10 mg PO DAILY #9 tab 03/01/18 [Rx]








Follow up Appointment(s)/Referral(s): 


Iban Wallace MD [Primary Care Provider] - 03/07/18 2:15 pm


Patient Instructions/Handouts:  Angioedema (GEN)


Activity/Diet/Wound Care/Special Instructions: 


Follow a healthy diet


Take medications as prescribed





Discharge Disposition: HOME SELF-CARE

## 2018-03-27 ENCOUNTER — HOSPITAL ENCOUNTER (OUTPATIENT)
Dept: HOSPITAL 47 - RADMAMWWP | Age: 78
Discharge: HOME | End: 2018-03-27
Payer: MEDICARE

## 2018-03-27 DIAGNOSIS — Z12.31: Primary | ICD-10-CM

## 2018-03-27 DIAGNOSIS — M85.89: ICD-10-CM

## 2018-03-27 PROCEDURE — 77063 BREAST TOMOSYNTHESIS BI: CPT

## 2018-03-27 PROCEDURE — 77067 SCR MAMMO BI INCL CAD: CPT

## 2018-03-27 PROCEDURE — 77080 DXA BONE DENSITY AXIAL: CPT

## 2018-03-27 NOTE — BD
EXAMINATION TYPE: MG DEXA axial skeleton.  

 

DATE OF EXAM: 3/27/2018

 

CLINICAL HISTORY: M81.0

 

Height:  58 inches

 

 

 

Weight:  101

 

FRAX RISK QUESTIONS:

Alcohol (3 or more units per day):  no

Family History (Parent hip fracture):  yes, mother

Glucocorticoids (More than 3mos):  no

           (Ex: prednisone, prednisolone, methylprednisolone, dexamethasone, and hydrocortisone).    
     

History of Fracture in Adulthood: no

Secondary Osteoporosis: 

  1.  Type 1 Diabetes: no

  2.  Hyperthyroidism: no

  3.  Menopause before 45: no

  4.  Malnutrition: currently taking protein drink per Physician to increase weight

 

 

  5.  Chronic liver disease: no

Rheumatoid Arthritis: no

Current Tobacco Use: no

 

RISK FACTORS 

HISTORY OF: 

Family History of Osteoporosis: unsure

Active: yes

Diet low in dairy products/other sources of calcium:  no

Postmenopausal woman: yes

Take estrogen and/or progesterone medications: not now

How long: hormonal contraceptives about 8 years; estrogen/progesterone over 10 years or more

Lost more than 2 inches in height since high school: possibly...patient states height at one time may
 have been about 60 inches

Frequent falls: no

Poor Health: no

Hyperparathyroidism: no

Adrenal Insufficiency: no

 

MEDICATIONS: 

Prednisone or other steroids: no

Thyroid Medications:  no

Osteoporosis Medications: no

Additional Medications: calcium , folic acid, Claritin, protein drink

 

 

Additional History: enlarged spleen

 

 

EXAM MEASUREMENTS: 

Bone mineral densitometry was performed using the Promedior System.

Bone mineral density as measured about the Lumbar spine is:  

----- L1-L4(G/cm2): 1.440

T Score Values are as follows:

----- L2: 2.7

----- L3: 3.9

----- L4: 2.1

----- L1-L4: 2.2

Bone mineral density has: Decreased -5.3% since study of: 11/21/2014

 

Bone mineral density about the R hip (g/cm2): 0.881

Bone mineral density about the L hip (g/cm2): 0.893

T Score values are as follows:

-----R Neck: -1.1

-----L Neck: -1.0

-----R Total: 0.2

-----L Total: -0.1

Bone mineral density has: Decreased -1.6% since study of: 11/21/2014

 

 

IMPRESSION:

Osteopenia (T Score between -2.5 and -1).

 

There is slightly increased risk of fracture and the patient may be considered 

for treatment. 

 

Re-Screen 2-5 years.

 

 

 

 

 

NOTE:  T-SCORE=SD OF THE YOUNG ADULT MEAN.

## 2018-03-29 NOTE — MM
Reason for exam: screening  (asymptomatic).

Last mammogram was performed 1 year and 4 months ago.



History:

Patient is postmenopausal.

Family history of breast cancer in aunt at age 50.

Took hormonal contraceptives for 8 years beginning at age 25.  Took estrogen for 

11 years.  Took progesterone for 11 years.



Physical Findings:

A clinical breast exam by your physician is recommended on an annual basis and 

results should be correlated with mammographic findings.



MG 3D Screening Mammo W/Cad

Bilateral CC and MLO view(s) were taken.

Prior study comparison: December 5, 2016, bilateral MG 3d screening mammo w/cad.  

December 2, 2015, bilateral MG screening mammo w CAD.

The breast tissue is extremely dense which could obscure a lesion on mammography. 

There is chronic nodularity in the right breast.  No significant changes when 

compared with prior studies.





ASSESSMENT: Negative, BI-RAD 1



RECOMMENDATION:

Routine screening mammogram of both breasts in 1 year.

## 2019-01-29 ENCOUNTER — HOSPITAL ENCOUNTER (OUTPATIENT)
Dept: HOSPITAL 47 - OR | Age: 79
Discharge: HOME | End: 2019-01-29
Attending: INTERNAL MEDICINE
Payer: MEDICARE

## 2019-01-29 VITALS — BODY MASS INDEX: 21.5 KG/M2

## 2019-01-29 VITALS — SYSTOLIC BLOOD PRESSURE: 111 MMHG | HEART RATE: 78 BPM | DIASTOLIC BLOOD PRESSURE: 77 MMHG

## 2019-01-29 VITALS — RESPIRATION RATE: 18 BRPM

## 2019-01-29 VITALS — TEMPERATURE: 98.1 F

## 2019-01-29 DIAGNOSIS — C83.00: Primary | ICD-10-CM

## 2019-01-29 DIAGNOSIS — Z87.891: ICD-10-CM

## 2019-01-29 DIAGNOSIS — Z88.8: ICD-10-CM

## 2019-01-29 DIAGNOSIS — Z88.1: ICD-10-CM

## 2019-01-29 DIAGNOSIS — Z79.899: ICD-10-CM

## 2019-01-29 DIAGNOSIS — K21.9: ICD-10-CM

## 2019-01-29 DIAGNOSIS — R16.1: ICD-10-CM

## 2019-01-29 DIAGNOSIS — Z88.0: ICD-10-CM

## 2019-01-29 LAB
BASOPHILS # BLD AUTO: 0 K/UL (ref 0–0.2)
BASOPHILS NFR BLD AUTO: 0 %
EOSINOPHIL # BLD AUTO: 0 K/UL (ref 0–0.7)
EOSINOPHIL NFR BLD AUTO: 1 %
ERYTHROCYTE [DISTWIDTH] IN BLOOD BY AUTOMATED COUNT: 4.03 M/UL (ref 3.8–5.4)
ERYTHROCYTE [DISTWIDTH] IN BLOOD: 14.4 % (ref 11.5–15.5)
HCT VFR BLD AUTO: 34.7 % (ref 34–46)
HGB BLD-MCNC: 11.6 GM/DL (ref 11.4–16)
LYMPHOCYTES # SPEC AUTO: 1.2 K/UL (ref 1–4.8)
LYMPHOCYTES NFR SPEC AUTO: 32 %
MCH RBC QN AUTO: 28.8 PG (ref 25–35)
MCHC RBC AUTO-ENTMCNC: 33.5 G/DL (ref 31–37)
MCV RBC AUTO: 85.9 FL (ref 80–100)
MONOCYTES # BLD AUTO: 0.1 K/UL (ref 0–1)
MONOCYTES NFR BLD AUTO: 4 %
NEUTROPHILS # BLD AUTO: 2.2 K/UL (ref 1.3–7.7)
NEUTROPHILS NFR BLD AUTO: 60 %
PLATELET # BLD AUTO: 69 K/UL (ref 150–450)
WBC # BLD AUTO: 3.8 K/UL (ref 3.8–10.6)

## 2019-01-29 PROCEDURE — 85025 COMPLETE CBC W/AUTO DIFF WBC: CPT

## 2019-01-29 PROCEDURE — 38222 DX BONE MARROW BX & ASPIR: CPT

## 2019-01-29 RX ADMIN — POTASSIUM CHLORIDE SCH MLS: 14.9 INJECTION, SOLUTION INTRAVENOUS at 12:18

## 2019-01-29 RX ADMIN — POTASSIUM CHLORIDE SCH MLS: 14.9 INJECTION, SOLUTION INTRAVENOUS at 11:21

## 2019-01-29 NOTE — PCN
PROCEDURE NOTE



PROCEDURE:

Bone marrow aspirate and biopsy.



INDICATION:

Anemia and splenomegaly, suspect lymphoma.



PROCEDURE:

After obtaining consent from the patient, the procedure was performed in the endoscopy

suite and general anesthesia performed by the anesthesia team.  The patient was put in

the left lateral decubitus position.  The right posterior superior iliac crest was

localized. Skin was cleaned with ChloraPrep, all sterile procedures were followed and 2

mL of 1% Xylocaine was used for local anesthetic.  Monoject needle was inserted and 15

mL of aspirate and about 1 cm core biopsy was obtained without any difficulties.

Pressure applied afterwards.  There was negligible blood loss.  Patient tolerated

procedure very well without any immediate complications.





MMODL / IJN: [id number] / Job#: [id number]

## 2019-04-02 ENCOUNTER — HOSPITAL ENCOUNTER (EMERGENCY)
Dept: HOSPITAL 47 - EC | Age: 79
LOS: 1 days | Discharge: HOME | End: 2019-04-03
Payer: MEDICARE

## 2019-04-02 DIAGNOSIS — Z87.891: ICD-10-CM

## 2019-04-02 DIAGNOSIS — Z88.8: ICD-10-CM

## 2019-04-02 DIAGNOSIS — Z91.048: ICD-10-CM

## 2019-04-02 DIAGNOSIS — Z88.1: ICD-10-CM

## 2019-04-02 DIAGNOSIS — T78.3XXA: Primary | ICD-10-CM

## 2019-04-02 DIAGNOSIS — Z91.09: ICD-10-CM

## 2019-04-02 DIAGNOSIS — Z88.0: ICD-10-CM

## 2019-04-02 DIAGNOSIS — Z85.828: ICD-10-CM

## 2019-04-02 DIAGNOSIS — Z79.899: ICD-10-CM

## 2019-04-02 PROCEDURE — 99283 EMERGENCY DEPT VISIT LOW MDM: CPT

## 2019-04-02 PROCEDURE — 96374 THER/PROPH/DIAG INJ IV PUSH: CPT

## 2019-04-02 PROCEDURE — 70490 CT SOFT TISSUE NECK W/O DYE: CPT

## 2019-04-02 PROCEDURE — 96375 TX/PRO/DX INJ NEW DRUG ADDON: CPT

## 2019-04-02 NOTE — CT
EXAM:

  CT Neck Without Intravenous Contrast

 

CLINICAL HISTORY:

  Possible allergic reaction, facial swelling

 

TECHNIQUE:

  Axial computed tomography images of the neck without intravenous 

contrast.  CTDI is 4.87  mGy and DLP is 162.2 mGy-cm.  This CT exam was 

performed using one or more of the following dose reduction techniques: 

automated exposure control, adjustment of the mA and/or kV according to 

patient size, and/or use of iterative reconstruction technique.  Coronal 

and sagittal reconstructions are 1

 

COMPARISON:

  No relevant prior studies available.

 

FINDINGS:

  Oropharynx:  Unremarkable.  No significant tonsillar enlargement.

  Hypopharynx:  Unremarkable.

  Larynx:  Unremarkable.  Normal epiglottis.

  Trachea:  Unremarkable.

  Retropharyngeal space:  Unremarkable.

  Submandibular/parotid glands:  Unremarkable.  Glands are normal in size.

 

  Thyroid:  Unremarkable.  No enlarged or calcified nodules.

  Bones/joints:  Moderate degenerative disc disease in the visualized 

osseous structures.  Osteopenia.  No acute fracture.

  Soft tissues:  Mild to moderate subcutaneous soft tissue swelling of 

bilateral face and neck, worse on the right.  No drainable fluid 

collection.

  Vasculature:  Minimal vascular calcifications.

  Lymph nodes:  Unremarkable.  No lymphadenopathy.

  Sinuses:  Left sinus disease predominantly involving sphenoid sinus and 

posterior ethmoid air cells.

  Lung apices:  Questionable minimal pulmonary edema.

  Pleural space:  Small left pleural effusion.

 

IMPRESSION:     

1.  Small left pleural effusion.

2.  Mild to moderate subcutaneous soft tissue swelling of bilateral face 

and neck, worse on the right.  No drainable fluid collection.

3.  Questionable minimal pulmonary edema.

## 2019-04-02 NOTE — ED
Allergic Reaction HPI





- General


Chief complaint: Allergic Reaction


Stated complaint: Facial swelling/Allergic reaction


Time Seen by Provider: 19 22:26


Source: patient


Mode of arrival: ambulatory


Limitations: no limitations





- History of Present Illness


Initial Comments: 





This patient is a 78-year-old woman with history of idiopathic angioedema.  The 

patient presents today with complaint of lower lip swelling.  She states she has

had similar reactions to this probably 6 times previously.  She states today 

that she had some dental work and they had given her some lidocaine injections. 

She was being fitted for a partial bridge.  She then went home and noticed that 

she was starting to have increased swelling so she presents here.  The patient d

oes not have any dyspnea, trouble with swallowing or speech.  Patient denies 

symptoms suggestive of infection, including no fever or chills.  No dental pain.

 She is not having systemic symptoms, clue no chest pain, dyspnea, palpitations 

or syncope.  No pain into the neck or sublingual area


MD Complaint: facial swelling


Onset/Timin


-: hour(s)


Exposure: other


Symptoms: lip swelling


Severity: moderate


Treatment Prior to Arrival: none


Previous Allergy History: angioedema





- Related Data


                                Home Medications











 Medication  Instructions  Recorded  Confirmed


 


Folic Acid 1 mg PO DAILY 17


 


Calcium Carbonate/Vitamin D3 1 tab PO DAILY 19





[Caltrate 600 Plus D3 Tablet]   


 


Loratadine [Claritin] 10 mg PO DAILY 19


 


Vit C/E/Zn/Coppr/Lutein/Zeaxan 2 cap PO DAILY 19





[Preservision Areds 2 Softgel]   








                                  Previous Rx's











 Medication  Instructions  Recorded


 


Famotidine [Pepcid] 20 mg PO BID #10 tablet 19


 


predniSONE 60 mg PO DAILY #30 tab 19











                                    Allergies











Allergy/AdvReac Type Severity Reaction Status Date / Time


 


azithromycin Allergy Unknown inflamed Verified 19 22:29





   tongue and  





   tastebuds  


 


cat dander Allergy Unknown eyes Verified 19 22:29





   itch,swelling,  





   sneezing  


 


clindamycin Allergy Unknown throat & Verified 19 22:29





   tongue  





   swells  


 


feathers Allergy Unknown eyes itch, Verified 19 22:29





   swelling,  





   sneezing  


 


Penicillins Allergy Unknown family hx Verified 04/02/19 22:29





   of  





   allergy-  





   will not  





   take.  


 


ragweed pollen Allergy Unknown eyes itch, Verified 19 22:29





   sneezing,  





   swelling  


 


diphenhydramine AdvReac Unknown Hallucinations- Verified 19 22:29





[From Benadryl]   PT STATES  





   RECEIVED  





   TOO MUCH  


 


dust Allergy Unknown eyes itch, Uncoded 19 11:02





   swelling,  





   sneezing  














Review of Systems


ROS Statement: 


Those systems with pertinent positive or pertinent negative responses have been 

documented in the HPI.





ROS Other: All systems not noted in ROS Statement are negative.


Constitutional: Denies: fever, chills


ENT: Reports: other (Lip swelling)


Respiratory: Denies: cough, dyspnea


Cardiovascular: Denies: chest pain, palpitations, syncope


Gastrointestinal: Denies: abdominal pain, vomiting


Skin: Denies: rash


Neurological: Denies: headache, weakness





Past Medical History


Past Medical History: Cancer, GERD/Reflux, Hyperlipidemia


Additional Past Medical History / Comment(s): BASAL CELL CANCER, PATHOGENIC 

ANGIOEDEMA- STATES SHE HAS BEEN IN THE ER 5-6 TIMES WITH SWELLING AND HARDNESS 

OF THE TONGUE  AND UVULA SWELLING. , HX OF SHINGLES ON LEFT HIP AND STATES SKIN 

IS "SPONGY"., ENVIRONMENTAL ALLERGIES., STATES SENSITIVE TO MEDICATIONS. , PT 

STATES LOW PLATELETS.


History of Any Multi-Drug Resistant Organisms: None Reported


Past Surgical History: Tubal Ligation


Additional Past Surgical History / Comment(s): Cataracts surgery, colonoscopy


Past Anesthesia/Blood Transfusion Reactions: No Reported Reaction


Past Psychological History: No Psychological Hx Reported


Smoking Status: Former smoker





- Past Family History


  ** Mother


Family Medical History: Hypertension, Myocardial Infarction (MI)





  ** Father


Family Medical History: Diabetes Mellitus, Hypertension





  ** Brother(s)


Family Medical History: No Reported History





  ** Daughter(s)


History Unknown: Yes (Healthy 2 daughters no sons no  sisters)


Family Medical History: No Reported History





General Exam


Limitations: no limitations


General appearance: alert, in no apparent distress


Head exam: Present: atraumatic, normocephalic


Eye exam: Present: normal appearance.  Absent: scleral icterus, conjunctival 

injection


ENT exam: Present: mucous membranes moist, other (Patient has symmetric swelling

of the lower lip.  No swelling of the tongue, sublingual area, or to the pharynx

or uvula.  No evidence of dental abscess or infection)


Neck exam: Present: normal inspection, full ROM.  Absent: tenderness, 

meningismus, lymphadenopathy


Respiratory exam: Present: normal lung sounds bilaterally.  Absent: respiratory 

distress, wheezes, rales, rhonchi, stridor


Cardiovascular Exam: Present: regular rate, normal rhythm, normal heart sounds. 

Absent: systolic murmur, diastolic murmur, rubs, gallop


Neurological exam: Present: alert, CN II-XII intact


Skin exam: Present: warm, dry, intact, normal color.  Absent: rash





Course


                                   Vital Signs











  19





  22:09 00:45


 


Temperature 98.2 F 98.7 F


 


Pulse Rate 102 H 93


 


Respiratory 20 18





Rate  


 


Blood Pressure 170/78 132/62


 


O2 Sat by Pulse 97 97





Oximetry  














Medical Decision Making





- Medical Decision Making





 is 78-year-old woman with angioedema.  She has had multiple similar 

episodes.  Given the recent dental procedure, we did perform computed tomography

scan of the neck, there is no evidence of Ion angina.  The patient did have 

improvement following medications and wishes to go home.  Discussed appropriate 

further care as well as return parameters.





Disposition


Clinical Impression: 


 Angioedema





Disposition: HOME SELF-CARE


Condition: Good


Instructions (If sedation given, give patient instructions):  Angioedema (ED)


Prescriptions: 


Famotidine [Pepcid] 20 mg PO BID #10 tablet


predniSONE 60 mg PO DAILY #30 tab


Is patient prescribed a controlled substance at d/c from ED?: No


Referrals: 


Iban Wallace MD [Primary Care Provider] - 1-2 days

## 2019-04-03 VITALS
DIASTOLIC BLOOD PRESSURE: 62 MMHG | SYSTOLIC BLOOD PRESSURE: 132 MMHG | HEART RATE: 93 BPM | RESPIRATION RATE: 18 BRPM | TEMPERATURE: 98.7 F

## 2019-04-29 ENCOUNTER — HOSPITAL ENCOUNTER (OUTPATIENT)
Dept: HOSPITAL 47 - RADMAMWWP | Age: 79
Discharge: HOME | End: 2019-04-29
Attending: INTERNAL MEDICINE
Payer: MEDICARE

## 2019-04-29 DIAGNOSIS — Z12.31: Primary | ICD-10-CM

## 2019-04-29 PROCEDURE — 77063 BREAST TOMOSYNTHESIS BI: CPT

## 2019-04-29 PROCEDURE — 77067 SCR MAMMO BI INCL CAD: CPT

## 2019-05-01 NOTE — MM
Reason for exam: screening  (asymptomatic).

Last mammogram was performed 1 year and 1 month ago.



History:

Patient is postmenopausal.

Family history of breast cancer in aunt at age 50.

Took hormonal contraceptives for 8 years beginning at age 25.  Took estrogen for 

11 years.  Took progesterone for 11 years.



Physical Findings:

A clinical breast exam by your physician is recommended on an annual basis and 

results should be correlated with mammographic findings.



MG 3D Screening Mammo W/Cad

Bilateral CC and MLO view(s) were taken.

Prior study comparison: March 27, 2018, bilateral MG 3d screening mammo w/cad.  

December 5, 2016, bilateral MG 3d screening mammo w/cad.

The breast tissue is extremely dense which could obscure a lesion on mammography. 

No suspicious abnormality.  No significant changes when compared with prior 

studies.





ASSESSMENT: Negative, BI-RAD 1



RECOMMENDATION:

Routine screening mammogram of both breasts in 1 year.

## 2020-09-29 ENCOUNTER — HOSPITAL ENCOUNTER (OUTPATIENT)
Dept: HOSPITAL 47 - RADMAMWWP | Age: 80
Discharge: HOME | End: 2020-09-29
Attending: INTERNAL MEDICINE
Payer: MEDICARE

## 2020-09-29 DIAGNOSIS — Z12.31: Primary | ICD-10-CM

## 2020-09-29 PROCEDURE — 77067 SCR MAMMO BI INCL CAD: CPT

## 2020-09-30 NOTE — MM
Reason for exam: screening  (asymptomatic).

Last mammogram was performed 1 year and 5 months ago.



History:

Patient is postmenopausal.

Family history of breast cancer in aunt at age 50.

Took hormonal contraceptives for 8 years beginning at age 25.  Took estrogen for 

11 years.  Took progesterone for 11 years.



Physical Findings:

A clinical breast exam by your physician is recommended on an annual basis and 

results should be correlated with mammographic findings.



MG Screening Mammo w CAD

Bilateral CC and MLO view(s) were taken.

Prior study comparison: April 29, 2019, bilateral MG 3d screening mammo w/cad.  

March 27, 2018, bilateral MG 3d screening mammo w/cad.

The breast tissue is heterogeneously dense. This may lower the sensitivity of 

mammography.  There are benign appearing round vascular calcifications in the left

breast. There is no discrete abnormality.





ASSESSMENT: Benign, BI-RAD 2



RECOMMENDATION:

Routine screening mammogram of both breasts in 1 year.

## 2021-11-29 ENCOUNTER — HOSPITAL ENCOUNTER (OUTPATIENT)
Dept: HOSPITAL 47 - RADMAMWWP | Age: 81
Discharge: HOME | End: 2021-11-29
Attending: INTERNAL MEDICINE
Payer: MEDICARE

## 2021-11-29 DIAGNOSIS — Z80.3: ICD-10-CM

## 2021-11-29 DIAGNOSIS — Z12.31: Primary | ICD-10-CM

## 2021-11-29 DIAGNOSIS — Z78.0: ICD-10-CM

## 2021-11-29 PROCEDURE — 77067 SCR MAMMO BI INCL CAD: CPT

## 2021-12-02 NOTE — MM
Reason for exam: screening  (asymptomatic).

Last mammogram was performed 1 year and 2 months ago.



History:

Patient is postmenopausal and history of other cancer.

Family history of breast cancer in aunt at age 50.

Took hormonal contraceptives for 8 years beginning at age 25.  Took estrogen for 

11 years.  Took progesterone for 11 years.



Physical Findings:

A clinical breast exam by your physician is recommended on an annual basis and 

results should be correlated with mammographic findings.



MG Screening Mammo w CAD

Bilateral CC and MLO view(s) were taken.

Prior study comparison: September 29, 2020, bilateral MG screening mammo w CAD.  

April 29, 2019, bilateral MG 3d screening mammo w/cad.

The breast tissue is heterogeneously dense. This may lower the sensitivity of 

mammography.  No significant changes when compared with prior studies.





ASSESSMENT: Benign, BI-RAD 2



RECOMMENDATION:

Routine screening mammogram of both breasts in 1 year.

## 2023-02-02 ENCOUNTER — HOSPITAL ENCOUNTER (OUTPATIENT)
Dept: HOSPITAL 47 - RADMAMWWP | Age: 83
Discharge: HOME | End: 2023-02-02
Attending: INTERNAL MEDICINE
Payer: MEDICARE

## 2023-02-02 DIAGNOSIS — Z78.0: ICD-10-CM

## 2023-02-02 DIAGNOSIS — Z12.31: Primary | ICD-10-CM

## 2023-02-02 DIAGNOSIS — Z80.3: ICD-10-CM

## 2023-02-02 PROCEDURE — 77067 SCR MAMMO BI INCL CAD: CPT

## 2023-02-03 NOTE — MM
Reason for Exam: Screening  (asymptomatic). 

Last mammogram was performed 1 year(s) and 3 month(s) ago. 





Patient History: 

Menarche at age 13. First Full-Term Pregnancy at age 19. Postmenopausal. Other cancer. Patient used

Estrogen for 11 years. Patient used Progesterone for 11 years. Hormonal Contraceptives for 8 years

from age 25 until age 33.

Maternal aunt had breast cancer, age 50. 





Risk Values: 

Anabel 5 year model risk: 1.1%.

NCI Lifetime model risk: 1.5%.





Prior Study Comparison: 

4/29/2019 Bilateral Screening Mammogram, [facility]. 9/29/2020 Bilateral Screening Mammogram, [facility].

11/29/2021 Bilateral Screening Mammogram, [facility]. 





Tissue Density: 

There are scattered fibroglandular densities.





Findings: 

Analyzed By CAD. 

There is no suspicious group of microcalcifications or new suspicious mass in either breast. 





Overall Assessment: Negative, BI-RAD 1





Management: 

Screening Mammogram of both breasts in 1 year.

A clinical breast exam by your physician is recommended on an annual basis and results should be

correlated with mammographic findings.



Electronically signed and approved by: Leopold M. Fregoli, M.D. Radiologis

## 2025-03-31 ENCOUNTER — HOSPITAL ENCOUNTER (OUTPATIENT)
Dept: HOSPITAL 47 - RADECHMAIN | Age: 85
Discharge: HOME | End: 2025-03-31
Attending: INTERNAL MEDICINE
Payer: MEDICARE

## 2025-03-31 DIAGNOSIS — I35.1: Primary | ICD-10-CM

## 2025-03-31 PROCEDURE — 93306 TTE W/DOPPLER COMPLETE: CPT

## 2025-03-31 NOTE — CA
Transthoracic Echo Report 

 Name: Mimi Rios 

 MRN:    [id number] 

 Age:    84     Gender:     F 

 

 :    1940 

 Exam Date:     2025 14:57 

 Exam Location: [facility] 

 Ht (in):     58     Wt (lb):     112 

 Ordering Physician:        Iban Wallace MD 

 Attending/Referring Phys: 

 Technician         Soledad Giron RDCS 

 Procedure CPT: 

 Indications:       I35.1 NONRHEUMATIC AORTIC (VALVE) INSUFFICIENCY 

 

 Cardiac Hx: 

 Technical Quality:      Good 

 Contrast 1:                                Total Dose (mL): 

 Contrast 2:                                Total Dose (mL): 

 

 MEASUREMENTS  (Male / Female) Normal Values 

 2D ECHO 

 LV Diastolic Diameter PLAX        4.2 cm                4.2 - 5.9 / 3.9 - 5.3 cm 

 LV Systolic Diameter PLAX         2.8 cm                 

 IVS Diastolic Thickness           0.9 cm                0.6 - 1.0 / 0.6 - 0.9 cm 

 LVPW Diastolic Thickness          0.8 cm                0.6 - 1.0 / 0.6 - 0.9 cm 

 LV Relative Wall Thickness        0.4                    

 LVOT Diameter                     1.8 cm                 

 LV Diastolic Volume MOD BP        66.7 cm???              67 - 155 / 56 - 104 cm??? 

 LV Systolic Volume MOD BP         24.9 cm???              22 - 58 / 19 - 49 cm??? 

 LV Ejection Fraction MOD BP       62.7 %                >= 55  % 

 LV Cardiac Index MOD BP           2134.1 cm???/min???m???      

 LV Diastolic Volume MOD 4C        67.4 cm???               

 LV Systolic Volume MOD 4C         23.6 cm???               

 LV Ejection Fraction MOD 4C       65.0 %                 

 LV Cardiac Index MOD 4C           2231.0 cm???/min???m???      

 LV Diastolic Length 4C            6.8 cm                 

 LV Systolic Length 4C             5.2 cm                 

 LV Diastolic Volume MOD 2C        63.2 cm???               

 LV Systolic Volume MOD 2C         25.5 cm???               

 LV Ejection Fraction MOD 2C       59.6 %                 

 LV Cardiac Index MOD 2C           1920.1 cm???/min???m???      

 LV Diastolic Length 2C            6.4 cm                 

 LV Systolic Length 2C             5.0 cm                 

 LA Volume                         55.8 cm???              18 - 58 / 22 - 52 cm??? 

 LA Volume Index                   38.4 cm???/m???           16 - 28 cm???/m??? 

 Ascending Aorta Diameter          2.9 cm                 

 

 DOPPLER 

 AV Peak Velocity                  198.3 cm/s             

 AV Peak Gradient                  15.7 mmHg              

 AV Mean Velocity                  141.0 cm/s             

 AV Mean Gradient                  9.0 mmHg               

 AV Velocity Time Integral         44.9 cm                

 LVOT Peak Velocity                105.2 cm/s             

 LVOT Peak Gradient                4.4 mmHg               

 LVOT Velocity Time Integral       22.6 cm                

 LVOT Stroke Volume                60.6 cm???               

 LVOT Stroke Volume Index          42.6 ml/m???             

 LVOT Cardiac Index                3088.3 cm???/min???m???      

 AV Area Cont Eq vti               1.3 cm???                

 AV Area Cont Eq pk                1.4 cm???                

 MV Peak Velocity                  129.8 cm/s             

 MV Peak Gradient                  6.7 mmHg               

 MV Mean Velocity                  78.4 cm/s              

 MV Mean Gradient                  2.8 mmHg               

 MV Velocity Time Integral         31.0 cm                

 MV Area PHT                       3.5 cm???                

 Mitral E Point Velocity           84.8 cm/s              

 Mitral A Point Velocity           110.6 cm/s             

 Mitral E to A Ratio               0.8                    

 MV Deceleration Time              218.5 ms               

 TR Peak Velocity                  266.9 cm/s             

 TR Peak Gradient                  28.5 mmHg              

 Right Atrial Pressure             5.0 mmHg               

 Pulmonary Artery Systolic Pressu  33.5 mmHg              

 Right Ventricular Systolic Press  33.5 mmHg              

 PV Peak Velocity                  94.7 cm/s              

 PV Peak Gradient                  3.6 mmHg               

 

 

 FINDINGS 

 Left Ventricle 

 Left ventricular ejection fraction is estimated at 60 %. Left ventricular  

 cavity size normal. Left ventricular wall thickness normal. No obvious regional  

 wall motion abnormalities. 

 

 Right Ventricle 

 Normal right ventricular size and function. Right ventricular systolic pressure  

 within normal limits. 

 

 Right Atrium 

 Normal right atrial size. 

 

 Left Atrium 

 Mildly increased left atrial volume. 

 

 Mitral Valve 

 Mitral valve thickened. Mitral annular calcification. No evidence for mitral  

 valve prolapse. Minimal mitral stenosis. Mild mitral regurgitation. 

 

 Aortic Valve 

 Trileaflet aortic valve. Aortic valve sclerosis. No aortic stenosis. Mild  

 aortic regurgitation. 

 

 Tricuspid Valve 

 Structurally normal tricuspid valve. No tricuspid stenosis. Mild tricuspid  

 regurgitation. 

 

 Pulmonic Valve 

 Structurally normal pulmonic valve. No pulmonic stenosis. Mild pulmonic  

 regurgitation. 

 

 Pericardium 

 No pericardial effusion. 

 

 Aorta 

 Normal size aortic root and proximal ascending aorta. 

 

 CONCLUSIONS 

 Indication: Aortic valve regurgitation 

 

 Preserved LV size and function 

 No significant valvular abnormalities 

 Aortic sclerosis mitral annular calcification 

 Previewed by:  

 Dr. Rian Simmons MD 

 (Electronically Signed) 

 Final Date:      2025 19:05

## 2025-04-28 ENCOUNTER — HOSPITAL ENCOUNTER (OUTPATIENT)
Dept: HOSPITAL 47 - RADBDWWP | Age: 85
Discharge: HOME | End: 2025-04-28
Attending: INTERNAL MEDICINE
Payer: MEDICARE

## 2025-04-28 DIAGNOSIS — Z12.31: Primary | ICD-10-CM

## 2025-04-28 DIAGNOSIS — M81.0: ICD-10-CM

## 2025-04-28 DIAGNOSIS — Z78.0: ICD-10-CM

## 2025-04-28 DIAGNOSIS — Z80.3: ICD-10-CM

## 2025-04-28 DIAGNOSIS — R92.333: ICD-10-CM

## 2025-04-28 DIAGNOSIS — M85.89: ICD-10-CM

## 2025-04-28 PROCEDURE — 77067 SCR MAMMO BI INCL CAD: CPT

## 2025-04-28 PROCEDURE — 77080 DXA BONE DENSITY AXIAL: CPT
